# Patient Record
Sex: FEMALE | Race: ASIAN | URBAN - METROPOLITAN AREA
[De-identification: names, ages, dates, MRNs, and addresses within clinical notes are randomized per-mention and may not be internally consistent; named-entity substitution may affect disease eponyms.]

---

## 2017-02-01 ENCOUNTER — TELEPHONE (OUTPATIENT)
Dept: TRANSPLANT | Facility: CLINIC | Age: 47
End: 2017-02-01

## 2017-02-01 NOTE — TELEPHONE ENCOUNTER
"Living Kidney Donor Evaluation Completed: 2017 12:52:53 CT Updated: 2017 15:02:29 CT  Donor Name: Tadeo Baez MRN: [id number] Note: Might be MRN: [id number]. Verified : 1970 Age: 47Gender: Female Donor Height: 5  2\" Weight (lb): 160 BMI: 29.3  Donor Race:  Ethnicity: Not / Donor Preferred Language: English  Required?: No Current Marital Status:   Demographics: Home Address: [address] [place] [address] City: [place] State: [place] Zip: [address] Country: [place]  Best Phone: [phone] Alt Phone: Donor Email: Best Phone Type: Mobile Alt Phone Type:   Preferred Contact Time(s): 09:00 AM-11:00 AM, 11:00 AM-1:00 PM, 1:00 PM-4:00 PM Preferred Contact Day(s): Mon, Tue, Wed, Thur, Fri  Donor Screen: PASSED Donor Referred by: Other ([place] recipient on radio at a conference) Donor self reported ABO: Unknown  Recipient Information: Recipient Name (Last, First): Jose G Lazo Recipient :    [id number]  ... Donor Relationship: Met through social media Recipient Diagnosis: Recipient ABO:   MEDICAL HISTORY:  None Reported  MEDICATIONS:  None Reported  SURGICAL HISTORY:  None Reported  ALLERGIES:  NKDA  SOCIAL HISTORY:  EtOH: Denies  Illicit Drug Use: Denies  Tobacco: Denies  SELF-REPORTED FUNCTIONAL STATUS:  \"I am able to participate in strenuous sports such as swimming, singles tennis, football, basketball, or skiing\"  Exercise (2 X per week)  REVIEW OF ORGAN SYSTEMS: Airway or Lungs: No Blood Disorder: No Cancer: No Diabetes,Thyroid,Adrenal,Endocrine Disorder: No Digestive or Liver: No Female Health: No Heart or Circulatory System: No Immune Diseases: No Kidneys and Bladder: No Muscles,Bones,Joints: No Neuro: No Psych: No  FAMILY HISTORY: Confirmed:  Denied:  Cancer (denies)  Diabetes (denies)  Heart Disease (denies)  Hypertension (denies)  Kidney Disease (denies)  Kidney Stones (denies)  DONOR INFORMATION:  Level of Education: Associate's or technical degree complete Employment " Status: Unemployed Medical Insurance Status: No medical insurance Current Accommodation: Lives in rented accommodation Living Arrangement: With spouse Allow Disclosure to Recipient: Yes Paired Kidney Exchange Education Level: General idea Paired Kidney Exchange Participation Consent: Yes, only for mismatch Donor Motivation: Highly motivated donor  HIGH RISK BEHAVIOR:  Blood transfusion < 12 months. (NO)  Commercial sex < 12 months. (NO)  Illicit IV drug use < 5yrs. (NO)  Other high risk sexual contact < 12 months. (NO)  EMERGENCY CONTACT INFORMATION:  Primary Secondary First Name: Last Name: Phone Number: Relationship:   First Name: Junior  Last Name: Nestor Phone Number: [phone] Relationship: Sibling  REASON FOR DONATION:   I want to help  PHYSICIAN CONTACT INFORMATION:  PCP  Name: saad austin -    City: [facility] State: [place]  Phone: [phone]  ADDITIONAL NOTES:   Account already in Epic w this name and . Called applicant to verify last 4 of SSN, but no answer and unable to leave VM.  Patti     Verified it is same account. Will update demographics. [phone]. Patti

## 2017-02-02 ENCOUNTER — DOCUMENTATION ONLY (OUTPATIENT)
Dept: TRANSPLANT | Facility: CLINIC | Age: 47
End: 2017-02-02

## 2017-02-02 ENCOUNTER — TELEPHONE (OUTPATIENT)
Dept: TRANSPLANT | Facility: CLINIC | Age: 47
End: 2017-02-02

## 2017-02-02 NOTE — PROGRESS NOTES
Attempted to call for screening.No answer on phone and a message said voice mailbox not set up yet. No Email. Will try later.

## 2021-05-30 ENCOUNTER — RECORDS - HEALTHEAST (OUTPATIENT)
Dept: ADMINISTRATIVE | Facility: CLINIC | Age: 51
End: 2021-05-30

## 2021-05-31 ENCOUNTER — RECORDS - HEALTHEAST (OUTPATIENT)
Dept: ADMINISTRATIVE | Facility: CLINIC | Age: 51
End: 2021-05-31

## 2021-06-03 ENCOUNTER — RECORDS - HEALTHEAST (OUTPATIENT)
Dept: ADMINISTRATIVE | Facility: CLINIC | Age: 51
End: 2021-06-03